# Patient Record
Sex: FEMALE | Race: WHITE | Employment: STUDENT | URBAN - NONMETROPOLITAN AREA
[De-identification: names, ages, dates, MRNs, and addresses within clinical notes are randomized per-mention and may not be internally consistent; named-entity substitution may affect disease eponyms.]

---

## 2020-11-24 ENCOUNTER — HOSPITAL ENCOUNTER (OUTPATIENT)
Dept: NON INVASIVE DIAGNOSTICS | Age: 20
Discharge: HOME | End: 2020-11-24
Payer: COMMERCIAL

## 2020-11-24 LAB
LV EF: 60 %
LVEF MODALITY: NORMAL

## 2020-11-24 PROCEDURE — 93226 XTRNL ECG REC<48 HR SCAN A/R: CPT

## 2020-11-24 PROCEDURE — 93225 XTRNL ECG REC<48 HRS REC: CPT

## 2020-11-24 PROCEDURE — 93306 TTE W/DOPPLER COMPLETE: CPT

## 2020-11-24 NOTE — PROCEDURES
The skin was prepped and a holter monitor was applied. The patient was instructed on the documentation of symptoms and the purpose of the holter as well as the things to avoid while wearing the holter. The patient was instructed to remove and return the holter on 11/26/2020.   The serial number of the holter that was applied is [id number]

## 2020-12-04 LAB
ACQUISITION DURATION: NORMAL S
AVERAGE HEART RATE: 83 BPM
FASTEST SUPRAVENTRICULAR RATE: 112 BPM
HOOKUP DATE: NORMAL
HOOKUP TIME: NORMAL
LONGEST SUPRAVENTRICULAR RATE: 72 BPM
MAX HEART RATE TIME/DATE: NORMAL
MAX HEART RATE: 174 BPM
MIN HEART RATE TIME/DATE: NORMAL
MIN HEART RATE: 41 BPM
NUMBER OF FASTEST SUPRAVENTRICULAR BEATS: 123
NUMBER OF LONGEST SUPRAVENTRICULAR BEATS: 2184
NUMBER OF QRS COMPLEXES: NORMAL
NUMBER OF SUPRAVENTRICULAR BEATS IN RUNS: 5866
NUMBER OF SUPRAVENTRICULAR COUPLETS: 188
NUMBER OF SUPRAVENTRICULAR ECTOPICS: 6537
NUMBER OF SUPRAVENTRICULAR ISOLATED BEATS: 295
NUMBER OF SUPRAVENTRICULAR RUNS: 309
NUMBER OF VENTRICULAR BEATS IN RUNS: 0
NUMBER OF VENTRICULAR BIGEMINAL CYCLES: 0
NUMBER OF VENTRICULAR COUPLETS: 0
NUMBER OF VENTRICULAR ECTOPICS: 0
NUMBER OF VENTRICULAR ISOLATED BEATS: 0
NUMBER OF VENTRICULAR RUNS: 0